# Patient Record
(demographics unavailable — no encounter records)

---

## 2025-06-11 NOTE — REVIEW OF SYSTEMS
[Fever] : fever [Chills] : chills [Earache] : earache [Nasal Discharge] : nasal discharge [Sore Throat] : sore throat [Chest Pain] : chest pain [Shortness Of Breath] : shortness of breath [Headache] : headache [Hearing Loss] : no hearing loss

## 2025-06-11 NOTE — HISTORY OF PRESENT ILLNESS
[FreeTextEntry1] : sick for a week [de-identified] : 29F no significant PMH presenting for CC of feeling unwell for 1 week. She woke up feeling extremely congested Tuesday, for 1 week, has been feeling high pressure, mucus, and phlegm. She has also severe cough/persistent cough (Thursday), and now feeling clogged in her ears. She then had chills/body aches, then 3x fever (Friday->Saturday-104). She's tried nasal spray (Sudafed, mucinex), hot steam, hot tea.  She does endorse seasonal allergies but endorses this feels different.  Right now she feels like the cough has been bothering her the most, and nasal blockage, and the ears.  She started work as [employer]. She possibly had a sick contact 1 week ago.  CENTOR- 1. She has disinfected at home. Tyelenol /ibuprofen helps with the aches.  She only takes zyrtec and birth control.

## 2025-06-11 NOTE — END OF VISIT
[] : Resident [FreeTextEntry3] : #cough- productive, some fevers, chills, highest was 100.4, headache. yesterday had some vomiting. she is on birth control. had flu shot last year.  has tried mucinex, sudafed. bbeen a week. been getting worse. seems to be sinus bacterial infection. last night temp 104. start augmentin. counseld on med. no allergies to antibiotics. is currently not pregnant on OCP

## 2025-06-11 NOTE — ASSESSMENT
[FreeTextEntry1] : #Sinusitis #Congestion #Sore Throat #Acute Cough #Otitis media Presenting with 1 week of worsening sinus congestion, productive cough, then fevers, myalgias. Had no period of feeling better, just progressively worse. Sinusitis/URI, considering bacterial superimposed on viral. No sick contacts. Not improving after 1 week of symptomatic treatment.  - Start Augmentin 875 qd for 5 days. - Advised to go to ED if spiking high fevers, synopsizing hematemesis.    #HCM -Pap in 2023, reported normal -COVID in 2021, 2022: Recommended booster, patient will consider -TDAP: Does not recall last dose, will check records from previous provider and provide at next visit - Flu Shot 2024  Case d/w Dr Vazquez [facility] in 6 months, check in in a week if not improving.